# Patient Record
Sex: MALE | Race: WHITE | NOT HISPANIC OR LATINO | Employment: FULL TIME | URBAN - METROPOLITAN AREA
[De-identification: names, ages, dates, MRNs, and addresses within clinical notes are randomized per-mention and may not be internally consistent; named-entity substitution may affect disease eponyms.]

---

## 2023-02-08 DIAGNOSIS — N40.0 BPH (BENIGN PROSTATIC HYPERPLASIA): Primary | ICD-10-CM

## 2023-02-16 ENCOUNTER — OFFICE VISIT (OUTPATIENT)
Dept: UROLOGY | Facility: CLINIC | Age: 58
End: 2023-02-16
Payer: COMMERCIAL

## 2023-02-16 DIAGNOSIS — R35.0 URINARY FREQUENCY: Primary | ICD-10-CM

## 2023-02-16 DIAGNOSIS — N32.81 OAB (OVERACTIVE BLADDER): ICD-10-CM

## 2023-02-16 DIAGNOSIS — R39.15 URINARY URGENCY: ICD-10-CM

## 2023-02-16 LAB — POC RESIDUAL URINE VOLUME: 5 ML (ref 0–100)

## 2023-02-16 PROCEDURE — 99204 OFFICE O/P NEW MOD 45 MIN: CPT | Mod: S$GLB,,, | Performed by: NURSE PRACTITIONER

## 2023-02-16 PROCEDURE — 51798 POCT BLADDER SCAN: ICD-10-PCS | Mod: S$GLB,,, | Performed by: NURSE PRACTITIONER

## 2023-02-16 PROCEDURE — 4010F PR ACE/ARB THEARPY RXD/TAKEN: ICD-10-PCS | Mod: CPTII,S$GLB,, | Performed by: NURSE PRACTITIONER

## 2023-02-16 PROCEDURE — 51798 US URINE CAPACITY MEASURE: CPT | Mod: S$GLB,,, | Performed by: NURSE PRACTITIONER

## 2023-02-16 PROCEDURE — 1160F RVW MEDS BY RX/DR IN RCRD: CPT | Mod: CPTII,S$GLB,, | Performed by: NURSE PRACTITIONER

## 2023-02-16 PROCEDURE — 1160F PR REVIEW ALL MEDS BY PRESCRIBER/CLIN PHARMACIST DOCUMENTED: ICD-10-PCS | Mod: CPTII,S$GLB,, | Performed by: NURSE PRACTITIONER

## 2023-02-16 PROCEDURE — 99204 PR OFFICE/OUTPT VISIT, NEW, LEVL IV, 45-59 MIN: ICD-10-PCS | Mod: S$GLB,,, | Performed by: NURSE PRACTITIONER

## 2023-02-16 PROCEDURE — 1159F PR MEDICATION LIST DOCUMENTED IN MEDICAL RECORD: ICD-10-PCS | Mod: CPTII,S$GLB,, | Performed by: NURSE PRACTITIONER

## 2023-02-16 PROCEDURE — 1159F MED LIST DOCD IN RCRD: CPT | Mod: CPTII,S$GLB,, | Performed by: NURSE PRACTITIONER

## 2023-02-16 PROCEDURE — 4010F ACE/ARB THERAPY RXD/TAKEN: CPT | Mod: CPTII,S$GLB,, | Performed by: NURSE PRACTITIONER

## 2023-02-16 RX ORDER — AMLODIPINE BESYLATE 5 MG/1
TABLET ORAL
COMMUNITY
End: 2024-02-20 | Stop reason: SDUPTHER

## 2023-02-16 RX ORDER — TRAMADOL HYDROCHLORIDE 50 MG/1
TABLET ORAL
COMMUNITY
Start: 2023-02-15

## 2023-02-16 RX ORDER — MONTELUKAST SODIUM 10 MG/1
TABLET ORAL
COMMUNITY
Start: 2023-02-15

## 2023-02-16 RX ORDER — DIAZEPAM 5 MG/1
TABLET ORAL
COMMUNITY
Start: 2022-10-20

## 2023-02-16 RX ORDER — OLMESARTAN MEDOXOMIL 5 MG/1
TABLET ORAL
COMMUNITY

## 2023-02-16 RX ORDER — METHYLPREDNISOLONE 4 MG/1
TABLET ORAL
COMMUNITY
Start: 2022-12-01

## 2023-02-16 RX ORDER — MINOCYCLINE HYDROCHLORIDE 100 MG/1
CAPSULE ORAL
COMMUNITY
Start: 2022-12-07

## 2023-02-16 RX ORDER — BACLOFEN 10 MG/1
TABLET ORAL
COMMUNITY
Start: 2022-11-15

## 2023-02-16 RX ORDER — OLMESARTAN MEDOXOMIL 5 MG/1
TABLET ORAL
COMMUNITY
Start: 2023-02-15 | End: 2024-02-20 | Stop reason: SDUPTHER

## 2023-02-16 RX ORDER — TESTOSTERONE CYPIONATE 200 MG/ML
INJECTION, SOLUTION INTRAMUSCULAR
COMMUNITY
Start: 2023-01-19

## 2023-02-16 RX ORDER — GABAPENTIN 300 MG/1
CAPSULE ORAL
COMMUNITY
Start: 2022-11-21

## 2023-02-16 RX ORDER — AMLODIPINE BESYLATE 5 MG/1
TABLET ORAL
COMMUNITY
Start: 2023-02-15

## 2023-02-16 RX ORDER — ZOLPIDEM TARTRATE 12.5 MG/1
12.5 TABLET, FILM COATED, EXTENDED RELEASE ORAL
COMMUNITY
Start: 2022-12-12

## 2023-02-16 RX ORDER — MONTELUKAST SODIUM 10 MG/1
TABLET ORAL
COMMUNITY

## 2023-02-16 RX ORDER — SELENIUM SULFIDE 2.5 MG/100ML
LOTION TOPICAL
COMMUNITY
Start: 2022-09-28

## 2023-02-16 NOTE — PROGRESS NOTES
Subjective:       Patient ID: Shree Sarah is a 58 y.o. male.    Chief Complaint: No chief complaint on file.      HPI: 58-year-old male, new to [facility], presents with complaints of urinary frequency and urgency.    Patient states his going on for approximately 6 months but has been worse for approximately 1 month.  Patient states he had back surgery 6 months ago, just prior to onset of symptoms.    Patient does have history abdomen and is on testosterone.  He had increase dose causing his testosterone levels to rise above recommended arrange.  That is when he feels like the worsening of the symptoms started.    He has since decreased his dose.  States over the last week symptoms have improved.  Denies any pain or burning urination.  Denies any odor urine.  Denies any fever or body aches.  Denies any blood in.  Denies any significant weight loss.    PSA and DARNELL managed by his PCP.  States both are normal.       Past Medical History: No past medical history on file.    Past Surgical Historical: No past surgical history on file.     Medications:   Medication List with Changes/Refills   Current Medications    AMLODIPINE (NORVASC) 5 MG TABLET        AMLODIPINE (NORVASC) 5 MG TABLET        BACLOFEN (LIORESAL) 10 MG TABLET        DIAZEPAM (VALIUM) 5 MG TABLET        GABAPENTIN (NEURONTIN) 300 MG CAPSULE        METHYLPREDNISOLONE (MEDROL DOSEPACK) 4 MG TABLET        MINOCYCLINE (MINOCIN,DYNACIN) 100 MG CAPSULE        MONTELUKAST (SINGULAIR) 10 MG TABLET        MONTELUKAST (SINGULAIR) 10 MG TABLET        OLMESARTAN (BENICAR) 5 MG TAB        OLMESARTAN (BENICAR) 5 MG TAB        SELENIUM SULFIDE 2.5 % LOTN    APPLY TO AFFECTED SCALP AREA AND WASH ONCE DAILY AS NEEDED    TESTOSTERONE CYPIONATE (DEPOTESTOTERONE CYPIONATE) 200 MG/ML INJECTION        TRAMADOL (ULTRAM) 50 MG TABLET        ZOLPIDEM (AMBIEN CR) 12.5 MG CR TABLET    12.5 mg.        Past Social History:   Social History     Socioeconomic History    Marital  status: Single       Allergies: Review of patient's allergies indicates:  No Known Allergies     Family History: No family history on file.     Review of Systems:  Review of Systems   Constitutional:  Negative for activity change and appetite change.   HENT:  Negative for congestion and dental problem.    Eyes:  Negative for visual disturbance.   Respiratory:  Negative for chest tightness and shortness of breath.    Cardiovascular:  Negative for chest pain.   Gastrointestinal:  Negative for abdominal distention and abdominal pain.   Genitourinary:  Positive for frequency and urgency. Negative for decreased urine volume, difficulty urinating, dysuria, enuresis, flank pain, genital sores, hematuria, penile discharge, penile pain, penile swelling, scrotal swelling and testicular pain.   Musculoskeletal:  Negative for back pain and neck pain.   Skin:  Negative for color change.   Neurological:  Negative for dizziness.   Hematological:  Negative for adenopathy.   Psychiatric/Behavioral:  Negative for agitation, behavioral problems and confusion.      Physical Exam:  Physical Exam  Vitals and nursing note reviewed.   Constitutional:       Appearance: He is well-developed.   HENT:      Head: Normocephalic.   Eyes:      Pupils: Pupils are equal, round, and reactive to light.   Cardiovascular:      Rate and Rhythm: Normal rate and regular rhythm.      Heart sounds: Normal heart sounds.   Pulmonary:      Effort: Pulmonary effort is normal.      Breath sounds: Normal breath sounds.   Abdominal:      General: Bowel sounds are normal.      Palpations: Abdomen is soft.   Musculoskeletal:         General: Normal range of motion.      Cervical back: Normal range of motion and neck supple.   Skin:     General: Skin is warm and dry.   Neurological:      Mental Status: He is alert and oriented to person, place, and time.   Psychiatric:         Behavior: Behavior normal.     Urinalysis:  Protein  30, otherwise normal.    Bladder scan:  5 cc    Assessment/Plan:   OAB/frequency/urgency:  Patient's bladder scan is good at 5 cc.    Likely has some overactive bladder associated with back surgery and possibly his testosterone use.  Did discuss effects of tea, soda, and coffee.  Also discussed decreasing evening fluids.    Patient provided 6 week samples of Myrbetriq 25 mg daily.  Will notify us of the results.    Will re-evaluate in 3 months, sooner if needed.  Problem List Items Addressed This Visit    None  Visit Diagnoses       Urinary frequency    -  Primary    Relevant Orders    POCT Urinalysis (w/Micro Option)    POCT Bladder Scan    OAB (overactive bladder)        Relevant Orders    POCT Urinalysis (w/Micro Option)    POCT Bladder Scan    Urinary urgency        Relevant Orders    POCT Urinalysis (w/Micro Option)    POCT Bladder Scan

## 2023-04-06 ENCOUNTER — TELEPHONE (OUTPATIENT)
Dept: UROLOGY | Facility: CLINIC | Age: 58
End: 2023-04-06
Payer: COMMERCIAL

## 2023-04-06 DIAGNOSIS — R35.0 URINARY FREQUENCY: Primary | ICD-10-CM

## 2023-04-06 DIAGNOSIS — R39.15 URINARY URGENCY: ICD-10-CM

## 2023-04-06 RX ORDER — MIRABEGRON 25 MG/1
25 TABLET, FILM COATED, EXTENDED RELEASE ORAL DAILY
Qty: 30 TABLET | Refills: 11 | Status: SHIPPED | OUTPATIENT
Start: 2023-04-06 | End: 2024-02-20

## 2023-04-06 NOTE — TELEPHONE ENCOUNTER
----- Message from Elsa Saleh sent at 4/6/2023  1:36 PM CDT -----  Contact: pt  Pt calling about script for mirabegron (MYRBETRIQ) 25 mg.  Pt would like some less expensive and he can be reached at [phone]     [email or web address] - [facility] [address], [address]  [address], [address]  [facility] [address]  Phone: [phone] Fax: [phone]    Thanks,

## 2023-04-06 NOTE — TELEPHONE ENCOUNTER
----- Message from Mike Devlin MA sent at 4/5/2023  4:24 PM CDT -----  Regarding: Rx myrbetriq  Contact: patient    ----- Message -----  From: Amberly Olivas  Sent: 4/5/2023   4:03 PM CDT  To: [facility] Staff  Subject: medication                                       PT was seen a couple weeks ago and given samples of Myrbetriq and states he would like a prescription called for it bc it did help him, return call [phone]      [facility] - [facility] [address], [address]  [address], [address]  [facility] [address]  Phone: [phone] Fax: [phone]

## 2023-05-08 ENCOUNTER — TELEPHONE (OUTPATIENT)
Dept: UROLOGY | Facility: CLINIC | Age: 58
End: 2023-05-08
Payer: COMMERCIAL

## 2023-05-08 NOTE — TELEPHONE ENCOUNTER
Pt has upcoming appointment on 5/16/23, he states he will keep appointment and discuss another medication for OAB then.

## 2023-05-08 NOTE — TELEPHONE ENCOUNTER
----- Message from Amberly Olivas sent at 5/8/2023  9:17 AM CDT -----  Regarding: medication  Contact: patient  Patient called and said mybetriq was $400 dollars but paid for it this time cause did not want to go without med, however can't afford to pay that every month so the provider sent over a new medication he could not give me the name and he is running low on the medication and needs a new prescription called over. He also is stating he has questions on if the medication may be causing his testosterone levels to not be right and he may not need to stop taking it bc his body is going through changes, he would like a call back, return call [phone]      [facility] - [facility] [address]  [address], [address]  [facility] [address]  Phone: [phone] Fax: [phone]

## 2023-05-16 ENCOUNTER — OFFICE VISIT (OUTPATIENT)
Dept: UROLOGY | Facility: CLINIC | Age: 58
End: 2023-05-16
Payer: COMMERCIAL

## 2023-05-16 VITALS
TEMPERATURE: 99 F | RESPIRATION RATE: 18 BRPM | OXYGEN SATURATION: 97 % | HEART RATE: 78 BPM | DIASTOLIC BLOOD PRESSURE: 88 MMHG | SYSTOLIC BLOOD PRESSURE: 136 MMHG

## 2023-05-16 DIAGNOSIS — R39.15 URINARY URGENCY: ICD-10-CM

## 2023-05-16 DIAGNOSIS — N32.81 OAB (OVERACTIVE BLADDER): ICD-10-CM

## 2023-05-16 DIAGNOSIS — R35.0 URINARY FREQUENCY: Primary | ICD-10-CM

## 2023-05-16 LAB
BILIRUBIN, UA POC OHS: NEGATIVE
BLOOD, UA POC OHS: NEGATIVE
CLARITY, UA POC OHS: CLEAR
COLOR, UA POC OHS: YELLOW
GLUCOSE, UA POC OHS: NEGATIVE
KETONES, UA POC OHS: NEGATIVE
LEUKOCYTES, UA POC OHS: NEGATIVE
NITRITE, UA POC OHS: NEGATIVE
PH, UA POC OHS: 6
PROTEIN, UA POC OHS: NEGATIVE
SPECIFIC GRAVITY, UA POC OHS: 1.01
UROBILINOGEN, UA POC OHS: 1

## 2023-05-16 PROCEDURE — 81003 URINALYSIS AUTO W/O SCOPE: CPT | Mod: QW,S$GLB,, | Performed by: NURSE PRACTITIONER

## 2023-05-16 PROCEDURE — 3079F PR MOST RECENT DIASTOLIC BLOOD PRESSURE 80-89 MM HG: ICD-10-PCS | Mod: CPTII,S$GLB,, | Performed by: NURSE PRACTITIONER

## 2023-05-16 PROCEDURE — 99213 OFFICE O/P EST LOW 20 MIN: CPT | Mod: S$GLB,,, | Performed by: NURSE PRACTITIONER

## 2023-05-16 PROCEDURE — 1159F MED LIST DOCD IN RCRD: CPT | Mod: CPTII,S$GLB,, | Performed by: NURSE PRACTITIONER

## 2023-05-16 PROCEDURE — 4010F ACE/ARB THERAPY RXD/TAKEN: CPT | Mod: CPTII,S$GLB,, | Performed by: NURSE PRACTITIONER

## 2023-05-16 PROCEDURE — 4010F PR ACE/ARB THEARPY RXD/TAKEN: ICD-10-PCS | Mod: CPTII,S$GLB,, | Performed by: NURSE PRACTITIONER

## 2023-05-16 PROCEDURE — 1159F PR MEDICATION LIST DOCUMENTED IN MEDICAL RECORD: ICD-10-PCS | Mod: CPTII,S$GLB,, | Performed by: NURSE PRACTITIONER

## 2023-05-16 PROCEDURE — 3075F PR MOST RECENT SYSTOLIC BLOOD PRESS GE 130-139MM HG: ICD-10-PCS | Mod: CPTII,S$GLB,, | Performed by: NURSE PRACTITIONER

## 2023-05-16 PROCEDURE — 1160F RVW MEDS BY RX/DR IN RCRD: CPT | Mod: CPTII,S$GLB,, | Performed by: NURSE PRACTITIONER

## 2023-05-16 PROCEDURE — 3075F SYST BP GE 130 - 139MM HG: CPT | Mod: CPTII,S$GLB,, | Performed by: NURSE PRACTITIONER

## 2023-05-16 PROCEDURE — 99213 PR OFFICE/OUTPT VISIT, EST, LEVL III, 20-29 MIN: ICD-10-PCS | Mod: S$GLB,,, | Performed by: NURSE PRACTITIONER

## 2023-05-16 PROCEDURE — 3079F DIAST BP 80-89 MM HG: CPT | Mod: CPTII,S$GLB,, | Performed by: NURSE PRACTITIONER

## 2023-05-16 PROCEDURE — 81003 POCT URINALYSIS(INSTRUMENT): ICD-10-PCS | Mod: QW,S$GLB,, | Performed by: NURSE PRACTITIONER

## 2023-05-16 PROCEDURE — 1160F PR REVIEW ALL MEDS BY PRESCRIBER/CLIN PHARMACIST DOCUMENTED: ICD-10-PCS | Mod: CPTII,S$GLB,, | Performed by: NURSE PRACTITIONER

## 2023-05-16 NOTE — PROGRESS NOTES
Subjective:       Patient ID: Shree Sarah is a 58 y.o. male.    Chief Complaint: No chief complaint on file.      HPI: 58-year-old male, established patient, presents for 3 month follow-up.    Patient has presented with complaint of frequency, urgency, and some nocturia.    We started the patient on Myrbetriq 25 mg.  Patient did have improvement with medication, however medication is too expensive.  Medications costing him about 400 dollars a month.    He has a history hypogonadism.  He is on testosterone injections.  States he recently had a change in his dosing which cause an elevation in his testosterone levels.  He then had his testosterone dosing changed again to get his levels under control.  After adjusting his dosage he noticed improvement with his urinary complaints.    He stopped the Myrbetriq 4 days ago, and states he is not yet had any recurrence of his symptoms.      Patient has his PSA monitored by his PCP.    He admits today that he has not had a DARNELL in a couple of years.      At this time patient denies any significant frequency, urgency, or nocturia.  Denies any difficulty voiding.  States he is a pretty good stream from start to finish.    No other urinary complaints at this time.       Past Medical History: History reviewed. No pertinent past medical history.    Past Surgical Historical: History reviewed. No pertinent surgical history.     Medications:   Medication List with Changes/Refills   Current Medications    AMLODIPINE (NORVASC) 5 MG TABLET        AMLODIPINE (NORVASC) 5 MG TABLET        BACLOFEN (LIORESAL) 10 MG TABLET        DIAZEPAM (VALIUM) 5 MG TABLET        GABAPENTIN (NEURONTIN) 300 MG CAPSULE        METHYLPREDNISOLONE (MEDROL DOSEPACK) 4 MG TABLET        MINOCYCLINE (MINOCIN,DYNACIN) 100 MG CAPSULE        MIRABEGRON (MYRBETRIQ) 25 MG TB24 ER TABLET    Take 1 tablet (25 mg total) by mouth once daily.    MONTELUKAST (SINGULAIR) 10 MG TABLET        MONTELUKAST (SINGULAIR) 10 MG  TABLET        OLMESARTAN (BENICAR) 5 MG TAB        OLMESARTAN (BENICAR) 5 MG TAB        SELENIUM SULFIDE 2.5 % LOTN    APPLY TO AFFECTED SCALP AREA AND WASH ONCE DAILY AS NEEDED    TESTOSTERONE CYPIONATE (DEPOTESTOTERONE CYPIONATE) 200 MG/ML INJECTION        TRAMADOL (ULTRAM) 50 MG TABLET        ZOLPIDEM (AMBIEN CR) 12.5 MG CR TABLET    12.5 mg.        Past Social History:   Social History     Socioeconomic History    Marital status: Single       Allergies: Review of patient's allergies indicates:  No Known Allergies     Family History: History reviewed. No pertinent family history.     Review of Systems:  Review of Systems   Constitutional:  Negative for activity change and appetite change.   HENT:  Negative for congestion and dental problem.    Respiratory:  Negative for chest tightness and shortness of breath.    Cardiovascular:  Negative for chest pain.   Gastrointestinal:  Negative for abdominal distention and abdominal pain.   Genitourinary:  Negative for decreased urine volume, difficulty urinating, dysuria, enuresis, flank pain, frequency, genital sores, hematuria, penile discharge, penile pain, penile swelling, scrotal swelling, testicular pain and urgency.   Musculoskeletal:  Negative for back pain and neck pain.   Neurological:  Negative for dizziness.   Hematological:  Negative for adenopathy.   Psychiatric/Behavioral:  Negative for agitation, behavioral problems and confusion.      Physical Exam:  Physical Exam  Vitals and nursing note reviewed.   Constitutional:       Appearance: He is well-developed.   HENT:      Head: Normocephalic.   Cardiovascular:      Rate and Rhythm: Normal rate and regular rhythm.      Heart sounds: Normal heart sounds.   Pulmonary:      Effort: Pulmonary effort is normal.      Breath sounds: Normal breath sounds.   Abdominal:      General: Bowel sounds are normal.      Palpations: Abdomen is soft.   Genitourinary:     Prostate: Not enlarged (Prostate exam is benign.  Prostate  smooth with no nodules and nontender.  Prostate is symmetrical.).   Skin:     General: Skin is warm and dry.   Neurological:      Mental Status: He is alert and oriented to person, place, and time.     Urinalysis:  Normal   Bladder scan:  49 cc    Assessment/Plan:   Frequency/urgency/overactive bladder:  Myrbetriq is too expensive for the patient.    Patient has been off medication for 4 days.  States he noticed improvement after changing in testosterone dosage.    He has not noticed any recurrence since stopping his Myrbetriq.  Patient will notify us for any recurrence.  If he does have recurrence will consider VESIcare or oxybutynin.    Cm test is not covered by his insurance.    Will plan follow-up in 1 year, sooner if needed.  Problem List Items Addressed This Visit    None  Visit Diagnoses       Urinary frequency    -  Primary    Urinary urgency        OAB (overactive bladder)

## 2023-12-06 ENCOUNTER — OUTSIDE PLACE OF SERVICE (OUTPATIENT)
Dept: INTERVENTIONAL RADIOLOGY/VASCULAR | Facility: CLINIC | Age: 58
End: 2023-12-06
Payer: COMMERCIAL

## 2023-12-06 PROCEDURE — 74240 X-RAY XM UPR GI TRC 1CNTRST: CPT | Mod: 26,,, | Performed by: RADIOLOGY

## 2024-02-20 ENCOUNTER — OFFICE VISIT (OUTPATIENT)
Dept: CARDIOTHORACIC SURGERY | Facility: CLINIC | Age: 59
End: 2024-02-20
Payer: COMMERCIAL

## 2024-02-20 VITALS
SYSTOLIC BLOOD PRESSURE: 88 MMHG | WEIGHT: 159.5 LBS | HEART RATE: 74 BPM | DIASTOLIC BLOOD PRESSURE: 60 MMHG | OXYGEN SATURATION: 97 % | RESPIRATION RATE: 20 BRPM

## 2024-02-20 DIAGNOSIS — R79.89 ABNORMAL SERUM CREATININE LEVEL: ICD-10-CM

## 2024-02-20 DIAGNOSIS — Z01.810 PRE-PROCEDURAL CARDIOVASCULAR EXAMINATION: Primary | ICD-10-CM

## 2024-02-20 DIAGNOSIS — K44.9 HIATAL HERNIA: ICD-10-CM

## 2024-02-20 PROCEDURE — 99203 OFFICE O/P NEW LOW 30 MIN: CPT | Mod: S$GLB,,, | Performed by: NURSE PRACTITIONER

## 2024-02-20 PROCEDURE — 3044F HG A1C LEVEL LT 7.0%: CPT | Mod: CPTII,S$GLB,, | Performed by: NURSE PRACTITIONER

## 2024-02-20 PROCEDURE — 4010F ACE/ARB THERAPY RXD/TAKEN: CPT | Mod: CPTII,S$GLB,, | Performed by: NURSE PRACTITIONER

## 2024-02-20 PROCEDURE — 3074F SYST BP LT 130 MM HG: CPT | Mod: CPTII,S$GLB,, | Performed by: NURSE PRACTITIONER

## 2024-02-20 PROCEDURE — 1159F MED LIST DOCD IN RCRD: CPT | Mod: CPTII,S$GLB,, | Performed by: NURSE PRACTITIONER

## 2024-02-20 PROCEDURE — 3078F DIAST BP <80 MM HG: CPT | Mod: CPTII,S$GLB,, | Performed by: NURSE PRACTITIONER

## 2024-02-20 NOTE — PROGRESS NOTES
Subjective:      Patient ID: Shree Sarah is a 59 y.o. male who presents for evaluation of hernia    Chief Complaint: No chief complaint on file.    HPI   59-year-old male with past medical history significant for hypertension, hyperlipidemia, CPAP use for obstructive sleep apnea, hiatal hernia, chronic back pain, pulmonary nodule presents for surgical evaluation of hiatal hernia.  He was referred by Dr. Willard.  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  He underwent an emergency exploratory laparoscopy.  He had reduction of stomach from incarcerated paraesophageal hiatal hernia and reduction of gastric volvulus.  Laparoscopic gastropexy.  Insertion fixation a gastrostomy tube in the body of the stomach. He reports some intolerance to food even when using his Peg tube that was replaced at [facility].     CT abdomen performed 01/24/2024 showed large hiatal hernia and gastric volvulus again noted.  Percutaneous tube tip terminates in the ventral abdominal wall muscle just outside the peritoneal cavity (tube does not extend into the stomach proper per radiology report)    CT chest 1/27/24 MPRESSION:    1.  Large hiatal hernia, with an air-fluid level visualized within the herniated intrathoracic stomach.   2.  Please see concurrent CT abdomen and pelvis for findings below the diaphragm.   3.  Left anterior descending coronary artery calcification.   4.  Subsegmental atelectasis in the left lower lobe.   5.  Small 2 to 4 mm pulmonary nodules scattered in the lungs bilaterally.   -- According to the Fleischner Society 2017 Guidelines for management of pulmonary nodules, a low risk patient with multiple nodules measuring less than 6 mm does not require routine follow-up. For a high risk patient, multiple nodules measuring less than 6 mm can be followed with a repeat chest CT at 12 months.     CT abd/pelvis  1/27/24  IMPRESSION:    1.  Large hiatal hernia, with an air-fluid level visualized within the herniated intrathoracic stomach.   2.  Soft tissue thickening seen along the prior gastrostomy tube tract.   3.  Please see concurrent CT chest for findings above the diaphragm.       Review of Systems   Constitutional: Negative for chills and fever.   HENT:  Negative for congestion and nosebleeds.    Eyes:  Negative for double vision and photophobia.   Cardiovascular:  Negative for claudication, cyanosis and dyspnea on exertion.   Respiratory:  Negative for shortness of breath.    Endocrine: Negative for cold intolerance and heat intolerance.   Skin:  Negative for color change, dry skin and flushing.   Musculoskeletal:  Negative for back pain, falls, joint pain and joint swelling.   Gastrointestinal:  Positive for bloating, anorexia, nausea and vomiting.   Genitourinary:  Negative for dysuria, flank pain and frequency.   Neurological:  Negative for dizziness and focal weakness.   Psychiatric/Behavioral:  Negative for altered mental status and depression.       No past medical history on file.   No past surgical history on file.   No family history on file.   Social History     Socioeconomic History    Marital status: Single        Medication List with Changes/Refills   Current Medications    AMLODIPINE (NORVASC) 5 MG TABLET        AMLODIPINE (NORVASC) 5 MG TABLET        BACLOFEN (LIORESAL) 10 MG TABLET        DIAZEPAM (VALIUM) 5 MG TABLET        GABAPENTIN (NEURONTIN) 300 MG CAPSULE        METHYLPREDNISOLONE (MEDROL DOSEPACK) 4 MG TABLET        MINOCYCLINE (MINOCIN,DYNACIN) 100 MG CAPSULE        MIRABEGRON (MYRBETRIQ) 25 MG TB24 ER TABLET    Take 1 tablet (25 mg total) by mouth once daily.    MONTELUKAST (SINGULAIR) 10 MG TABLET        MONTELUKAST (SINGULAIR) 10 MG TABLET        OLMESARTAN (BENICAR) 5 MG TAB        OLMESARTAN (BENICAR) 5 MG TAB        SELENIUM SULFIDE 2.5 % LOTN    APPLY TO AFFECTED SCALP AREA AND WASH  "ONCE DAILY AS NEEDED    TESTOSTERONE CYPIONATE (DEPOTESTOTERONE CYPIONATE) 200 MG/ML INJECTION        TRAMADOL (ULTRAM) 50 MG TABLET        ZOLPIDEM (AMBIEN CR) 12.5 MG CR TABLET    12.5 mg.        Objective:     There were no vitals taken for this visit.    Physical Exam  Constitutional:       Appearance: Normal appearance.   HENT:      Head: Normocephalic.      Nose: Nose normal.      Mouth/Throat:      Mouth: Mucous membranes are moist.   Eyes:      Pupils: Pupils are equal, round, and reactive to light.   Cardiovascular:      Rate and Rhythm: Normal rate and regular rhythm.   Pulmonary:      Effort: Pulmonary effort is normal.      Breath sounds: Normal breath sounds.   Abdominal:      General: Abdomen is flat.      Palpations: Abdomen is soft.   Musculoskeletal:         General: Normal range of motion.      Cervical back: Normal range of motion.   Skin:     General: Skin is warm.      Capillary Refill: Capillary refill takes less than 2 seconds.      Comments: Peg tube noted abdomen   Neurological:      General: No focal deficit present.      Mental Status: He is alert.   Psychiatric:         Mood and Affect: Mood normal.          Labs:  CMP  No results found for: "NA", "K", "CL", "CO2", "GLU", "BUN", "CREATININE", "CALCIUM", "PROT", "ALBUMIN", "BILITOT", "ALKPHOS", "AST", "ALT", "ANIONGAP", "ESTGFRAFRICA", "EGFRNONAA"   No results found for: "ALT", "AST", "GGT", "ALKPHOS", "BILITOT"   No results found for: "PT"  No results found for: "WBC", "HGB", "HCT", "MCV", "LABPLAT"    No results found for: "BLOODTYPE"    Imaging:  No results found for this or any previous visit.     No results found for this or any previous visit.      No image results found.     No image results found.       No results found for this visit on 02/20/24.       Pulmonary Functions Testing Results:    No results found for: "FEV1", "FVC", "VRC8HAK", "TLC", "DLCO"         Assessment & Plan:     Paraesophageal hernia S/P attempted repair  Peg " tube status  LISA  YAW  C Cpap  Pulmonary Nodule    2/20/24 Patient to open peg tube to burp it when he feels he needs to relieve gas. He will lay on his back. Likely LISA related to dehydration. This will improve when burping PEG. Repeat BMP in 1 week. F/U with PCP for BP managament and LISA. We will see him back in one month to consider surgical planning. Dr. Duron to discuss with Dr. Montse Rodas, DENNY   Cardiothoracic Surgery nurse practitioner

## 2024-03-20 ENCOUNTER — OFFICE VISIT (OUTPATIENT)
Dept: CARDIOTHORACIC SURGERY | Facility: CLINIC | Age: 59
End: 2024-03-20
Payer: COMMERCIAL

## 2024-03-20 VITALS
OXYGEN SATURATION: 97 % | WEIGHT: 159.13 LBS | DIASTOLIC BLOOD PRESSURE: 68 MMHG | BODY MASS INDEX: 23.57 KG/M2 | HEIGHT: 69 IN | RESPIRATION RATE: 20 BRPM | HEART RATE: 67 BPM | SYSTOLIC BLOOD PRESSURE: 104 MMHG

## 2024-03-20 DIAGNOSIS — K44.9 PARAESOPHAGEAL HERNIA: Primary | ICD-10-CM

## 2024-03-20 DIAGNOSIS — Z01.812 PRE-PROCEDURE LAB EXAM: ICD-10-CM

## 2024-03-20 DIAGNOSIS — Z01.810 PRE-PROCEDURAL CARDIOVASCULAR EXAMINATION: ICD-10-CM

## 2024-03-20 PROCEDURE — 3008F BODY MASS INDEX DOCD: CPT | Mod: CPTII,S$GLB,, | Performed by: THORACIC SURGERY (CARDIOTHORACIC VASCULAR SURGERY)

## 2024-03-20 PROCEDURE — 3044F HG A1C LEVEL LT 7.0%: CPT | Mod: CPTII,S$GLB,, | Performed by: THORACIC SURGERY (CARDIOTHORACIC VASCULAR SURGERY)

## 2024-03-20 PROCEDURE — 1159F MED LIST DOCD IN RCRD: CPT | Mod: CPTII,S$GLB,, | Performed by: THORACIC SURGERY (CARDIOTHORACIC VASCULAR SURGERY)

## 2024-03-20 PROCEDURE — 99213 OFFICE O/P EST LOW 20 MIN: CPT | Mod: S$GLB,,, | Performed by: THORACIC SURGERY (CARDIOTHORACIC VASCULAR SURGERY)

## 2024-03-20 PROCEDURE — 3078F DIAST BP <80 MM HG: CPT | Mod: CPTII,S$GLB,, | Performed by: THORACIC SURGERY (CARDIOTHORACIC VASCULAR SURGERY)

## 2024-03-20 PROCEDURE — 4010F ACE/ARB THERAPY RXD/TAKEN: CPT | Mod: CPTII,S$GLB,, | Performed by: THORACIC SURGERY (CARDIOTHORACIC VASCULAR SURGERY)

## 2024-03-20 PROCEDURE — 3074F SYST BP LT 130 MM HG: CPT | Mod: CPTII,S$GLB,, | Performed by: THORACIC SURGERY (CARDIOTHORACIC VASCULAR SURGERY)

## 2024-03-20 RX ORDER — MULTIVITAMIN
1 TABLET ORAL DAILY
COMMUNITY

## 2024-03-20 NOTE — PROGRESS NOTES
Subjective:      Patient ID: Shree Sarah is a 59 y.o. male who presents for evaluation of hernia    Chief Complaint: No chief complaint on file.    HPI   59-year-old male with past medical history significant for hypertension, hyperlipidemia, CPAP use for obstructive sleep apnea, hiatal hernia, chronic back pain, pulmonary nodule presents for surgical evaluation of hiatal hernia.  He was referred by Dr. Willard.  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  He underwent an emergency exploratory laparoscopy.  He had reduction of stomach from incarcerated paraesophageal hiatal hernia and reduction of gastric volvulus.  Laparoscopic gastropexy.  Insertion fixation a gastrostomy tube in the body of the stomach. He reports some intolerance to food even when using his Peg tube that was replaced at [facility].     CT abdomen performed 01/24/2024 showed large hiatal hernia and gastric volvulus again noted.  Percutaneous tube tip terminates in the ventral abdominal wall muscle just outside the peritoneal cavity (tube does not extend into the stomach proper per radiology report)    CT chest 1/27/24 MPRESSION:    1.  Large hiatal hernia, with an air-fluid level visualized within the herniated intrathoracic stomach.   2.  Please see concurrent CT abdomen and pelvis for findings below the diaphragm.   3.  Left anterior descending coronary artery calcification.   4.  Subsegmental atelectasis in the left lower lobe.   5.  Small 2 to 4 mm pulmonary nodules scattered in the lungs bilaterally.   -- According to the Fleischner Society 2017 Guidelines for management of pulmonary nodules, a low risk patient with multiple nodules measuring less than 6 mm does not require routine follow-up. For a high risk patient, multiple nodules measuring less than 6 mm can be followed with a repeat chest CT at 12 months.     CT abd/pelvis  1/27/24  IMPRESSION:    1.  Large hiatal hernia, with an air-fluid level visualized within the herniated intrathoracic stomach.   2.  Soft tissue thickening seen along the prior gastrostomy tube tract.   3.  Please see concurrent CT chest for findings above the diaphragm.     Clinic 3/20/24 Patient presents for a 1 month . He reports continued diet intolerances. He can eat a steak but can not tolerate other foods. He has had better blood pressure control.  He reports he is ready for surgery.     Review of Systems   Constitutional: Negative for chills and fever.   HENT:  Negative for congestion and nosebleeds.    Eyes:  Negative for double vision and photophobia.   Cardiovascular:  Negative for claudication, cyanosis and dyspnea on exertion.   Respiratory:  Negative for shortness of breath.    Endocrine: Negative for cold intolerance and heat intolerance.   Skin:  Negative for color change, dry skin and flushing.   Musculoskeletal:  Negative for back pain, falls, joint pain and joint swelling.   Gastrointestinal:  Positive for bloating, anorexia, nausea and vomiting.   Genitourinary:  Negative for dysuria, flank pain and frequency.   Neurological:  Negative for dizziness and focal weakness.   Psychiatric/Behavioral:  Negative for altered mental status and depression.       Past Medical History:   Diagnosis Date    HTN (hypertension)     Hypogonadism in male       Past Surgical History:   Procedure Laterality Date    BACK SURGERY      L4-L5    HIATAL HERNIA REPAIR      attempt - procedure aborted    INGUINAL HERNIA REPAIR Right       No family history on file.   Social History     Socioeconomic History    Marital status: Single   Tobacco Use    Smoking status: Never    Smokeless tobacco: Never   Substance and Sexual Activity    Alcohol use: Not Currently        Medication List with Changes/Refills   Current Medications    AMLODIPINE (NORVASC) 5 MG TABLET        BACLOFEN (LIORESAL) 10 MG TABLET        DIAZEPAM (VALIUM)  "5 MG TABLET        GABAPENTIN (NEURONTIN) 300 MG CAPSULE        METHYLPREDNISOLONE (MEDROL DOSEPACK) 4 MG TABLET        MINOCYCLINE (MINOCIN,DYNACIN) 100 MG CAPSULE        MONTELUKAST (SINGULAIR) 10 MG TABLET        MONTELUKAST (SINGULAIR) 10 MG TABLET        OLMESARTAN (BENICAR) 5 MG TAB        SELENIUM SULFIDE 2.5 % LOTN    APPLY TO AFFECTED SCALP AREA AND WASH ONCE DAILY AS NEEDED    TESTOSTERONE CYPIONATE (DEPOTESTOTERONE CYPIONATE) 200 MG/ML INJECTION        TRAMADOL (ULTRAM) 50 MG TABLET        ZOLPIDEM (AMBIEN CR) 12.5 MG CR TABLET    12.5 mg.        Objective:     There were no vitals taken for this visit.    Physical Exam  Constitutional:       Appearance: Normal appearance.   HENT:      Head: Normocephalic.      Nose: Nose normal.      Mouth/Throat:      Mouth: Mucous membranes are moist.   Eyes:      Pupils: Pupils are equal, round, and reactive to light.   Cardiovascular:      Rate and Rhythm: Normal rate and regular rhythm.   Pulmonary:      Effort: Pulmonary effort is normal.      Breath sounds: Normal breath sounds.   Abdominal:      General: Abdomen is flat.      Palpations: Abdomen is soft.   Musculoskeletal:         General: Normal range of motion.      Cervical back: Normal range of motion.   Skin:     General: Skin is warm.      Capillary Refill: Capillary refill takes less than 2 seconds.      Comments: Peg tube noted abdomen   Neurological:      General: No focal deficit present.      Mental Status: He is alert.   Psychiatric:         Mood and Affect: Mood normal.          Labs:  CMP  No results found for: "NA", "K", "CL", "CO2", "GLU", "BUN", "CREATININE", "CALCIUM", "PROT", "ALBUMIN", "BILITOT", "ALKPHOS", "AST", "ALT", "ANIONGAP", "ESTGFRAFRICA", "EGFRNONAA"   No results found for: "ALT", "AST", "GGT", "ALKPHOS", "BILITOT"   No results found for: "PT"  No results found for: "WBC", "HGB", "HCT", "MCV", "LABPLAT"    No results found for: "BLOODTYPE"    Imaging:  No results found for this or " "any previous visit.     No results found for this or any previous visit.      No image results found.     No image results found.       No results found for this visit on 02/20/24.       Pulmonary Functions Testing Results:    No results found for: "FEV1", "FVC", "XZX5AUW", "TLC", "DLCO"         Assessment & Plan:     Paraesophageal hernia S/P attempted repair  Peg tube status  LISA v. Ckd   YAW  C Cpap  Pulmonary Nodule    2/20/24 Discussed with Dr. Willard. He will get the patient scheduled for Paraesophageal hernia repair. We are available to assist as needed. We will draw labs and f/u with results. Patient is agreeable.         Dr. Chaparro Duron  Cardiothoracic Surgery nurse practitioner    "

## 2024-03-26 ENCOUNTER — OUTSIDE PLACE OF SERVICE (OUTPATIENT)
Dept: CARDIOTHORACIC SURGERY | Facility: CLINIC | Age: 59
End: 2024-03-26
Payer: COMMERCIAL

## 2024-03-26 PROCEDURE — 32551 INSERTION OF CHEST TUBE: CPT | Mod: LT,,, | Performed by: THORACIC SURGERY (CARDIOTHORACIC VASCULAR SURGERY)

## 2024-03-27 ENCOUNTER — OUTSIDE PLACE OF SERVICE (OUTPATIENT)
Dept: CARDIOTHORACIC SURGERY | Facility: CLINIC | Age: 59
End: 2024-03-27
Payer: COMMERCIAL

## 2024-03-27 PROCEDURE — 99232 SBSQ HOSP IP/OBS MODERATE 35: CPT | Mod: ,,, | Performed by: NURSE PRACTITIONER

## 2024-03-28 ENCOUNTER — OUTSIDE PLACE OF SERVICE (OUTPATIENT)
Dept: CARDIOTHORACIC SURGERY | Facility: CLINIC | Age: 59
End: 2024-03-28
Payer: COMMERCIAL

## 2024-03-28 PROCEDURE — 99232 SBSQ HOSP IP/OBS MODERATE 35: CPT | Mod: ,,, | Performed by: NURSE PRACTITIONER

## 2024-04-03 ENCOUNTER — OFFICE VISIT (OUTPATIENT)
Dept: CARDIOTHORACIC SURGERY | Facility: CLINIC | Age: 59
End: 2024-04-03
Payer: COMMERCIAL

## 2024-04-03 ENCOUNTER — HOSPITAL ENCOUNTER (OUTPATIENT)
Dept: RADIOLOGY | Facility: CLINIC | Age: 59
Discharge: HOME | End: 2024-04-03
Attending: THORACIC SURGERY (CARDIOTHORACIC VASCULAR SURGERY)
Payer: COMMERCIAL

## 2024-04-03 VITALS
WEIGHT: 157.69 LBS | HEIGHT: 69 IN | OXYGEN SATURATION: 97 % | RESPIRATION RATE: 20 BRPM | DIASTOLIC BLOOD PRESSURE: 62 MMHG | HEART RATE: 93 BPM | SYSTOLIC BLOOD PRESSURE: 99 MMHG | BODY MASS INDEX: 23.36 KG/M2

## 2024-04-03 DIAGNOSIS — I80.9 PHLEBITIS: ICD-10-CM

## 2024-04-03 DIAGNOSIS — K44.9 PARAESOPHAGEAL HERNIA: ICD-10-CM

## 2024-04-03 DIAGNOSIS — Z87.09 HISTORY OF PNEUMOTHORAX: ICD-10-CM

## 2024-04-03 DIAGNOSIS — K44.9 PARAESOPHAGEAL HERNIA: Primary | ICD-10-CM

## 2024-04-03 PROCEDURE — 3044F HG A1C LEVEL LT 7.0%: CPT | Mod: CPTII,S$GLB,, | Performed by: THORACIC SURGERY (CARDIOTHORACIC VASCULAR SURGERY)

## 2024-04-03 PROCEDURE — 4010F ACE/ARB THERAPY RXD/TAKEN: CPT | Mod: CPTII,S$GLB,, | Performed by: THORACIC SURGERY (CARDIOTHORACIC VASCULAR SURGERY)

## 2024-04-03 PROCEDURE — 99213 OFFICE O/P EST LOW 20 MIN: CPT | Mod: S$GLB,,, | Performed by: THORACIC SURGERY (CARDIOTHORACIC VASCULAR SURGERY)

## 2024-04-03 PROCEDURE — 3074F SYST BP LT 130 MM HG: CPT | Mod: CPTII,S$GLB,, | Performed by: THORACIC SURGERY (CARDIOTHORACIC VASCULAR SURGERY)

## 2024-04-03 PROCEDURE — 3078F DIAST BP <80 MM HG: CPT | Mod: CPTII,S$GLB,, | Performed by: THORACIC SURGERY (CARDIOTHORACIC VASCULAR SURGERY)

## 2024-04-03 PROCEDURE — 1159F MED LIST DOCD IN RCRD: CPT | Mod: CPTII,S$GLB,, | Performed by: THORACIC SURGERY (CARDIOTHORACIC VASCULAR SURGERY)

## 2024-04-03 PROCEDURE — 71046 X-RAY EXAM CHEST 2 VIEWS: CPT | Mod: 26,,, | Performed by: RADIOLOGY

## 2024-04-03 PROCEDURE — 3008F BODY MASS INDEX DOCD: CPT | Mod: CPTII,S$GLB,, | Performed by: THORACIC SURGERY (CARDIOTHORACIC VASCULAR SURGERY)

## 2024-04-03 RX ORDER — CEPHALEXIN 500 MG/1
500 CAPSULE ORAL EVERY 12 HOURS
Qty: 14 CAPSULE | Refills: 0 | Status: SHIPPED | OUTPATIENT
Start: 2024-04-03 | End: 2024-04-10

## 2024-04-04 NOTE — PROGRESS NOTES
Subjective:      Patient ID: Shree Sarah is a 59 y.o. male who presents for evaluation of hernia    Chief Complaint: Hosp f/u and s/p chest tube placement    HPI   59-year-old male with past medical history significant for hypertension, hyperlipidemia, CPAP use for obstructive sleep apnea, hiatal hernia, chronic back pain, pulmonary nodule presents for surgical evaluation of hiatal hernia.  He was referred by Dr. Willard.  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  He underwent an emergency exploratory laparoscopy.  He had reduction of stomach from incarcerated paraesophageal hiatal hernia and reduction of gastric volvulus.  Laparoscopic gastropexy.  Insertion fixation a gastrostomy tube in the body of the stomach. He reports some intolerance to food even when using his Peg tube that was replaced at [facility].     CT abdomen performed 01/24/2024 showed large hiatal hernia and gastric volvulus again noted.  Percutaneous tube tip terminates in the ventral abdominal wall muscle just outside the peritoneal cavity (tube does not extend into the stomach proper per radiology report)    CT chest 1/27/24 MPRESSION:    1.  Large hiatal hernia, with an air-fluid level visualized within the herniated intrathoracic stomach.   2.  Please see concurrent CT abdomen and pelvis for findings below the diaphragm.   3.  Left anterior descending coronary artery calcification.   4.  Subsegmental atelectasis in the left lower lobe.   5.  Small 2 to 4 mm pulmonary nodules scattered in the lungs bilaterally.   -- According to the Fleischner Society 2017 Guidelines for management of pulmonary nodules, a low risk patient with multiple nodules measuring less than 6 mm does not require routine follow-up. For a high risk patient, multiple nodules measuring less than 6 mm can be followed with a repeat chest CT at 12 months.     CT abd/pelvis  1/27/24  IMPRESSION:    1.  Large hiatal hernia, with an air-fluid level visualized within the herniated intrathoracic stomach.   2.  Soft tissue thickening seen along the prior gastrostomy tube tract.   3.  Please see concurrent CT chest for findings above the diaphragm.     Clinic 3/20/24 Patient presents for a 1 month . He reports continued diet intolerances. He can eat a steak but can not tolerate other foods. He has had better blood pressure control.  He reports he is ready for surgery.     Clinic 04/03/2024-patient presents for one-week follow up status post hiatal hernia repair by Dr. Willard.  Postprocedure had tension pneumothorax requiring chest tube placement by Dr. Duron.  Chest tube was removed prior to discharge from the hospital with resolution of the pneumothorax.  Patient reports to be doing well has some discomfort to the right upper extremity where the former IV access was placed.  He denies any fever or chills.  Has follow up scheduled with general surgery on Friday.  Reports improve tolerance to liquid diet.    Review of Systems   Constitutional: Negative for chills and fever.   HENT:  Negative for congestion and nosebleeds.    Eyes:  Negative for double vision and photophobia.   Cardiovascular:  Negative for claudication, cyanosis and dyspnea on exertion.   Respiratory:  Negative for shortness of breath.    Endocrine: Negative for cold intolerance and heat intolerance.   Skin:  Negative for color change, dry skin and flushing.   Musculoskeletal:  Negative for back pain, falls, joint pain and joint swelling.   Gastrointestinal:  Positive for abdominal pain. Negative for bloating, anorexia, nausea and vomiting.   Genitourinary:  Negative for dysuria, flank pain and frequency.   Neurological:  Negative for dizziness and focal weakness.   Psychiatric/Behavioral:  Negative for altered mental status and depression.       Past Medical History:   Diagnosis Date    HTN (hypertension)     Hypogonadism in  "male       Past Surgical History:   Procedure Laterality Date    BACK SURGERY      L4-L5    HIATAL HERNIA REPAIR      attempt - procedure aborted    INGUINAL HERNIA REPAIR Right       History reviewed. No pertinent family history.   Social History     Socioeconomic History    Marital status: Single   Tobacco Use    Smoking status: Never    Smokeless tobacco: Never   Substance and Sexual Activity    Alcohol use: Not Currently        Medication List with Changes/Refills   New Medications    CEPHALEXIN (KEFLEX) 500 MG CAPSULE    Take 1 capsule (500 mg total) by mouth every 12 (twelve) hours. for 7 days   Current Medications    AMLODIPINE (NORVASC) 5 MG TABLET        BACLOFEN (LIORESAL) 10 MG TABLET        DIAZEPAM (VALIUM) 5 MG TABLET        ERGOCALCIFEROL, VITAMIN D2, (VITAMIN D ORAL)    Take by mouth every 7 days.    FOLIC ACID ORAL    Take by mouth.    GABAPENTIN (NEURONTIN) 300 MG CAPSULE        METHYLPREDNISOLONE (MEDROL DOSEPACK) 4 MG TABLET        MINOCYCLINE (MINOCIN,DYNACIN) 100 MG CAPSULE        MONTELUKAST (SINGULAIR) 10 MG TABLET        MONTELUKAST (SINGULAIR) 10 MG TABLET        MULTIVITAMIN (ONE DAILY MULTIVITAMIN) PER TABLET    Take 1 tablet by mouth once daily.    OLMESARTAN (BENICAR) 5 MG TAB        SELENIUM SULFIDE 2.5 % LOTN    APPLY TO AFFECTED SCALP AREA AND WASH ONCE DAILY AS NEEDED    TESTOSTERONE CYPIONATE (DEPOTESTOTERONE CYPIONATE) 200 MG/ML INJECTION        TRAMADOL (ULTRAM) 50 MG TABLET        ZOLPIDEM (AMBIEN CR) 12.5 MG CR TABLET    12.5 mg.        Objective:     BP 99/62 (BP Location: Left arm, Patient Position: Sitting, BP Method: Medium (Automatic))   Pulse 93   Resp 20   Ht 5' 9" (1.753 m)   Wt 71.5 kg (157 lb 11.2 oz)   SpO2 97%   BMI 23.29 kg/m²     Physical Exam  Constitutional:       Appearance: Normal appearance.   HENT:      Head: Normocephalic.      Nose: Nose normal.      Mouth/Throat:      Mouth: Mucous membranes are moist.   Eyes:      Pupils: Pupils are equal, round, " "and reactive to light.   Cardiovascular:      Rate and Rhythm: Normal rate and regular rhythm.   Pulmonary:      Effort: Pulmonary effort is normal.      Breath sounds: Normal breath sounds.   Abdominal:      General: Abdomen is flat.      Palpations: Abdomen is soft.   Musculoskeletal:         General: Normal range of motion.      Cervical back: Normal range of motion.   Skin:     General: Skin is warm.      Capillary Refill: Capillary refill takes less than 2 seconds.      Comments: Surgical incisions well approximated.  Clean dry intact.  No erythema.  Right upper extremity phlebitis.   Neurological:      General: No focal deficit present.      Mental Status: He is alert.   Psychiatric:         Mood and Affect: Mood normal.          Labs:  CMP  No results found for: "NA", "K", "CL", "CO2", "GLU", "BUN", "CREATININE", "CALCIUM", "PROT", "ALBUMIN", "BILITOT", "ALKPHOS", "AST", "ALT", "ANIONGAP", "ESTGFRAFRICA", "EGFRNONAA"   No results found for: "ALT", "AST", "GGT", "ALKPHOS", "BILITOT"   No results found for: "PT"  No results found for: "WBC", "HGB", "HCT", "MCV", "LABPLAT"    No results found for: "BLOODTYPE"    Imaging:  No results found for this or any previous visit.     No results found for this or any previous visit.      No image results found.     No image results found.       No results found for this visit on 02/20/24.       Pulmonary Functions Testing Results:    No results found for: "FEV1", "FVC", "LST8VJS", "TLC", "DLCO"         Assessment & Plan:   Phlebitis   History of tension pneumothorax-resolved  Paraesophageal hernia S/P repair  Peg tube status  LISA v. Ckd   YAW  C Cpap  Pulmonary Nodule    04/03/2024-chest x-ray today shows resolution of the tension pneumothorax.  Patient reports to be doing well.  Incisions are healing well.  Does have phlebitis right upper extremity.  We will prescribe Keflex 500 mg p.o. b.i.d. x7 days.  Patient will take over-the-counter probiotics.  Patient will call if " worsening symptoms in 48 hours.  He will call and let us know if resolution of 7 days.  We will recommend CT follow up pulmonary nodules in 1 year after previous exam for multiple bilateral pulmonary nodules.    Dr. Chaparro Duron  Cardiothoracic Surgery

## 2024-05-16 ENCOUNTER — OFFICE VISIT (OUTPATIENT)
Dept: UROLOGY | Facility: CLINIC | Age: 59
End: 2024-05-16
Payer: COMMERCIAL

## 2024-05-16 VITALS — HEIGHT: 69 IN | WEIGHT: 163.5 LBS | BODY MASS INDEX: 24.22 KG/M2

## 2024-05-16 DIAGNOSIS — N32.81 OAB (OVERACTIVE BLADDER): Primary | ICD-10-CM

## 2024-05-16 LAB
BILIRUBIN, UA POC OHS: NEGATIVE
BLOOD, UA POC OHS: NEGATIVE
CLARITY, UA POC OHS: CLEAR
COLOR, UA POC OHS: YELLOW
GLUCOSE, UA POC OHS: NEGATIVE
KETONES, UA POC OHS: NEGATIVE
LEUKOCYTES, UA POC OHS: NEGATIVE
NITRITE, UA POC OHS: NEGATIVE
PH, UA POC OHS: 7
PROTEIN, UA POC OHS: NEGATIVE
SPECIFIC GRAVITY, UA POC OHS: 1.02
UROBILINOGEN, UA POC OHS: 0.2

## 2024-05-16 PROCEDURE — 81003 URINALYSIS AUTO W/O SCOPE: CPT | Mod: QW,S$GLB,, | Performed by: NURSE PRACTITIONER

## 2024-05-16 PROCEDURE — 99213 OFFICE O/P EST LOW 20 MIN: CPT | Mod: S$GLB,,, | Performed by: NURSE PRACTITIONER

## 2024-05-16 PROCEDURE — 1160F RVW MEDS BY RX/DR IN RCRD: CPT | Mod: CPTII,S$GLB,, | Performed by: NURSE PRACTITIONER

## 2024-05-16 PROCEDURE — 1159F MED LIST DOCD IN RCRD: CPT | Mod: CPTII,S$GLB,, | Performed by: NURSE PRACTITIONER

## 2024-05-16 PROCEDURE — 4010F ACE/ARB THERAPY RXD/TAKEN: CPT | Mod: CPTII,S$GLB,, | Performed by: NURSE PRACTITIONER

## 2024-05-16 PROCEDURE — 3008F BODY MASS INDEX DOCD: CPT | Mod: CPTII,S$GLB,, | Performed by: NURSE PRACTITIONER

## 2024-05-16 PROCEDURE — 3044F HG A1C LEVEL LT 7.0%: CPT | Mod: CPTII,S$GLB,, | Performed by: NURSE PRACTITIONER

## 2024-05-16 RX ORDER — TROSPIUM CHLORIDE ER 60 MG/1
60 CAPSULE ORAL DAILY
Qty: 30 CAPSULE | Refills: 11 | Status: SHIPPED | OUTPATIENT
Start: 2024-05-16 | End: 2025-05-16

## 2024-05-16 NOTE — PROGRESS NOTES
Subjective:       Patient ID: Shree Sarah is a 59 y.o. male.    Chief Complaint: No chief complaint on file.      HPI: 59-year-old male, established patient, last seen 1 year ago.    Patient has history of overactive bladder.    Patient was having frequency, and urgency.    We started the patient on Myrbetriq 25 mg daily.  Patient was having improvement but the medication was too expensive.    We are going to change medications.  However, patient decrease his testosterone dosing.  After decreasing his testosterone dose he would improvement in his overactive bladder symptoms.    Patient states he is started have some recurrence of episodes of frequency.  Denies any significant urgency or nocturia.  Denies having strain to void.  Often times when he voids he only have a small amount of output.  No other urinary complaints at this time.         Past Medical History:   Past Medical History:   Diagnosis Date    HTN (hypertension)     Hypogonadism in male        Past Surgical Historical:   Past Surgical History:   Procedure Laterality Date    BACK SURGERY      L4-L5    HIATAL HERNIA REPAIR      attempt - procedure aborted    INGUINAL HERNIA REPAIR Right         Medications:   Medication List with Changes/Refills   New Medications    TROSPIUM (SANCTURA XR) 60 MG CP24 CAPSULE    Take 1 capsule (60 mg total) by mouth once daily.   Current Medications    AMLODIPINE (NORVASC) 5 MG TABLET        BACLOFEN (LIORESAL) 10 MG TABLET        ERGOCALCIFEROL, VITAMIN D2, (VITAMIN D ORAL)    Take by mouth every 7 days.    FOLIC ACID ORAL    Take by mouth.    GABAPENTIN (NEURONTIN) 300 MG CAPSULE        MULTIVITAMIN (ONE DAILY MULTIVITAMIN) PER TABLET    Take 1 tablet by mouth once daily.    OLMESARTAN (BENICAR) 5 MG TAB        SELENIUM SULFIDE 2.5 % LOTN    APPLY TO AFFECTED SCALP AREA AND WASH ONCE DAILY AS NEEDED    TESTOSTERONE CYPIONATE (DEPOTESTOTERONE CYPIONATE) 200 MG/ML INJECTION        TRAMADOL (ULTRAM) 50 MG TABLET             Past Social History:   Social History     Socioeconomic History    Marital status: Single   Tobacco Use    Smoking status: Never    Smokeless tobacco: Never   Substance and Sexual Activity    Alcohol use: Not Currently       Allergies: Review of patient's allergies indicates:  No Known Allergies     Family History: No family history on file.     Review of Systems:  Review of Systems   Constitutional:  Negative for activity change and appetite change.   HENT:  Negative for congestion and dental problem.    Respiratory:  Negative for chest tightness and shortness of breath.    Cardiovascular:  Negative for chest pain.   Gastrointestinal:  Negative for abdominal distention and abdominal pain.   Genitourinary:  Positive for frequency. Negative for decreased urine volume, difficulty urinating, dysuria, enuresis, flank pain, genital sores, hematuria, penile discharge, penile pain, penile swelling, scrotal swelling, testicular pain and urgency.   Musculoskeletal:  Negative for back pain and neck pain.   Neurological:  Negative for dizziness.   Hematological:  Negative for adenopathy.   Psychiatric/Behavioral:  Negative for agitation, behavioral problems and confusion.        Physical Exam:  Physical Exam  Vitals and nursing note reviewed.   Constitutional:       Appearance: He is well-developed.   HENT:      Head: Normocephalic.   Cardiovascular:      Rate and Rhythm: Normal rate and regular rhythm.      Heart sounds: Normal heart sounds.   Pulmonary:      Effort: Pulmonary effort is normal.      Breath sounds: Normal breath sounds.   Abdominal:      General: Bowel sounds are normal.      Palpations: Abdomen is soft.   Skin:     General: Skin is warm and dry.   Neurological:      Mental Status: He is alert and oriented to person, place, and time.       Urinalysis: Normal   Bladder scan: 49 cc    Assessment/Plan:   Overactive bladder:  Patient had success with Myrbetriq in the past but medication was too expensive.     Patient drinks coffee in the morning.  Does drink some tea.  However, he does not notice any change in frequency based on tea consumption.    Will start patient on trospium 60 mg daily.    Patient follow-up in 8 weeks for re-evaluation, sooner if needed  Problem List Items Addressed This Visit    None  Visit Diagnoses       OAB (overactive bladder)    -  Primary    Relevant Medications    trospium (SANCTURA XR) 60 mg Cp24 capsule    Other Relevant Orders    POCT Urinalysis(Instrument) (Completed)    POCT Bladder Scan